# Patient Record
Sex: FEMALE | Race: WHITE | NOT HISPANIC OR LATINO
[De-identification: names, ages, dates, MRNs, and addresses within clinical notes are randomized per-mention and may not be internally consistent; named-entity substitution may affect disease eponyms.]

---

## 2017-10-09 ENCOUNTER — FORM ENCOUNTER (OUTPATIENT)
Age: 47
End: 2017-10-09

## 2017-10-10 ENCOUNTER — APPOINTMENT (OUTPATIENT)
Dept: ORTHOPEDIC SURGERY | Facility: CLINIC | Age: 47
End: 2017-10-10
Payer: COMMERCIAL

## 2017-10-10 ENCOUNTER — OUTPATIENT (OUTPATIENT)
Dept: OUTPATIENT SERVICES | Facility: HOSPITAL | Age: 47
LOS: 1 days | End: 2017-10-10
Payer: COMMERCIAL

## 2017-10-10 VITALS
HEIGHT: 68 IN | DIASTOLIC BLOOD PRESSURE: 70 MMHG | BODY MASS INDEX: 21.98 KG/M2 | WEIGHT: 145 LBS | SYSTOLIC BLOOD PRESSURE: 120 MMHG

## 2017-10-10 DIAGNOSIS — Z78.9 OTHER SPECIFIED HEALTH STATUS: ICD-10-CM

## 2017-10-10 PROCEDURE — 72100 X-RAY EXAM L-S SPINE 2/3 VWS: CPT

## 2017-10-10 PROCEDURE — 72082 X-RAY EXAM ENTIRE SPI 2/3 VW: CPT

## 2017-10-10 PROCEDURE — 99205 OFFICE O/P NEW HI 60 MIN: CPT

## 2017-10-10 PROCEDURE — 72082 X-RAY EXAM ENTIRE SPI 2/3 VW: CPT | Mod: 26

## 2017-10-11 ENCOUNTER — TRANSCRIPTION ENCOUNTER (OUTPATIENT)
Age: 47
End: 2017-10-11

## 2017-10-13 PROBLEM — Z78.9 EXERCISES OCCASIONALLY: Status: ACTIVE | Noted: 2017-10-10

## 2018-03-11 ENCOUNTER — FORM ENCOUNTER (OUTPATIENT)
Age: 48
End: 2018-03-11

## 2018-03-12 ENCOUNTER — APPOINTMENT (OUTPATIENT)
Dept: ORTHOPEDIC SURGERY | Facility: CLINIC | Age: 48
End: 2018-03-12
Payer: COMMERCIAL

## 2018-03-12 ENCOUNTER — OUTPATIENT (OUTPATIENT)
Dept: OUTPATIENT SERVICES | Facility: HOSPITAL | Age: 48
LOS: 1 days | End: 2018-03-12
Payer: COMMERCIAL

## 2018-03-12 VITALS — WEIGHT: 145 LBS | BODY MASS INDEX: 21.98 KG/M2 | HEIGHT: 68 IN

## 2018-03-12 DIAGNOSIS — M48.061 SPINAL STENOSIS, LUMBAR REGION WITHOUT NEUROGENIC CLAUDICATION: ICD-10-CM

## 2018-03-12 DIAGNOSIS — M62.562 MUSCLE WASTING AND ATROPHY, NOT ELSEWHERE CLASSIFIED, LEFT LOWER LEG: ICD-10-CM

## 2018-03-12 PROCEDURE — 72110 X-RAY EXAM L-2 SPINE 4/>VWS: CPT | Mod: 26

## 2018-03-12 PROCEDURE — 71046 X-RAY EXAM CHEST 2 VIEWS: CPT

## 2018-03-12 PROCEDURE — 99215 OFFICE O/P EST HI 40 MIN: CPT

## 2018-03-12 PROCEDURE — 71046 X-RAY EXAM CHEST 2 VIEWS: CPT | Mod: 26

## 2018-03-12 PROCEDURE — 72110 X-RAY EXAM L-2 SPINE 4/>VWS: CPT

## 2018-03-12 RX ORDER — DEXAMETHASONE 4 MG/1
4 TABLET ORAL
Qty: 14 | Refills: 0 | Status: ACTIVE | COMMUNITY
Start: 2018-03-12 | End: 1900-01-01

## 2018-03-12 RX ORDER — DIAZEPAM 5 MG/1
5 TABLET ORAL
Qty: 1 | Refills: 0 | Status: ACTIVE | COMMUNITY
Start: 2018-03-12 | End: 1900-01-01

## 2018-03-14 ENCOUNTER — APPOINTMENT (OUTPATIENT)
Dept: PULMONOLOGY | Facility: CLINIC | Age: 48
End: 2018-03-14

## 2018-03-20 ENCOUNTER — APPOINTMENT (OUTPATIENT)
Dept: ORTHOPEDIC SURGERY | Facility: CLINIC | Age: 48
End: 2018-03-20

## 2018-03-21 VITALS
WEIGHT: 151.46 LBS | HEIGHT: 68 IN | TEMPERATURE: 98 F | RESPIRATION RATE: 16 BRPM | SYSTOLIC BLOOD PRESSURE: 124 MMHG | OXYGEN SATURATION: 99 % | DIASTOLIC BLOOD PRESSURE: 61 MMHG | HEART RATE: 73 BPM

## 2018-03-21 NOTE — ASU PREOP CHECKLIST - SELECT TESTS ORDERED
PT/PTT/INR/EKG/CXR/BMP/urine c&s/Urinalysis/CBC urine c&s  urine hcg-neg/BMP/INR/PT/PTT/Urinalysis/EKG/CXR/CBC

## 2018-03-22 ENCOUNTER — APPOINTMENT (OUTPATIENT)
Dept: ORTHOPEDIC SURGERY | Facility: HOSPITAL | Age: 48
End: 2018-03-22

## 2018-03-22 ENCOUNTER — OUTPATIENT (OUTPATIENT)
Dept: OUTPATIENT SERVICES | Facility: HOSPITAL | Age: 48
LOS: 1 days | Discharge: HOME | End: 2018-03-22
Payer: COMMERCIAL

## 2018-03-22 VITALS
OXYGEN SATURATION: 99 % | RESPIRATION RATE: 17 BRPM | HEART RATE: 70 BPM | TEMPERATURE: 99 F | SYSTOLIC BLOOD PRESSURE: 144 MMHG | DIASTOLIC BLOOD PRESSURE: 82 MMHG

## 2018-03-22 DIAGNOSIS — Z98.890 OTHER SPECIFIED POSTPROCEDURAL STATES: Chronic | ICD-10-CM

## 2018-03-22 PROCEDURE — 86900 BLOOD TYPING SEROLOGIC ABO: CPT

## 2018-03-22 PROCEDURE — 86850 RBC ANTIBODY SCREEN: CPT

## 2018-03-22 PROCEDURE — 76000 FLUOROSCOPY <1 HR PHYS/QHP: CPT

## 2018-03-22 PROCEDURE — 63030 LAMOT DCMPRN NRV RT 1 LMBR: CPT | Mod: 50

## 2018-03-22 PROCEDURE — 86901 BLOOD TYPING SEROLOGIC RH(D): CPT

## 2018-03-22 PROCEDURE — 63030 LAMOT DCMPRN NRV RT 1 LMBR: CPT | Mod: LT

## 2018-03-22 RX ORDER — ACETAMINOPHEN WITH CODEINE 300MG-30MG
1 TABLET ORAL EVERY 4 HOURS
Qty: 0 | Refills: 0 | Status: DISCONTINUED | OUTPATIENT
Start: 2018-03-22 | End: 2018-03-22

## 2018-03-22 RX ORDER — DIAZEPAM 5 MG
1 TABLET ORAL
Qty: 42 | Refills: 0 | OUTPATIENT
Start: 2018-03-22 | End: 2018-04-04

## 2018-03-22 RX ORDER — HYDROMORPHONE HYDROCHLORIDE 2 MG/ML
0.5 INJECTION INTRAMUSCULAR; INTRAVENOUS; SUBCUTANEOUS
Qty: 0 | Refills: 0 | Status: DISCONTINUED | OUTPATIENT
Start: 2018-03-22 | End: 2018-03-22

## 2018-03-22 RX ORDER — ACETAMINOPHEN WITH CODEINE 300MG-30MG
2 TABLET ORAL EVERY 4 HOURS
Qty: 0 | Refills: 0 | Status: DISCONTINUED | OUTPATIENT
Start: 2018-03-22 | End: 2018-03-22

## 2018-03-22 RX ORDER — SODIUM CHLORIDE 9 MG/ML
1000 INJECTION, SOLUTION INTRAVENOUS
Qty: 0 | Refills: 0 | Status: DISCONTINUED | OUTPATIENT
Start: 2018-03-22 | End: 2018-03-22

## 2018-03-22 RX ORDER — METHOCARBAMOL 500 MG/1
500 TABLET, FILM COATED ORAL EVERY 8 HOURS
Qty: 0 | Refills: 0 | Status: DISCONTINUED | OUTPATIENT
Start: 2018-03-22 | End: 2018-03-22

## 2018-03-22 RX ORDER — CEPHALEXIN 500 MG
1 CAPSULE ORAL
Qty: 12 | Refills: 0 | OUTPATIENT
Start: 2018-03-22 | End: 2018-03-23

## 2018-03-22 RX ORDER — ONDANSETRON 8 MG/1
4 TABLET, FILM COATED ORAL ONCE
Qty: 0 | Refills: 0 | Status: DISCONTINUED | OUTPATIENT
Start: 2018-03-22 | End: 2018-03-22

## 2018-03-22 RX ORDER — BUPIVACAINE 13.3 MG/ML
20 INJECTION, SUSPENSION, LIPOSOMAL INFILTRATION ONCE
Qty: 0 | Refills: 0 | Status: DISCONTINUED | OUTPATIENT
Start: 2018-03-22 | End: 2018-03-22

## 2018-03-22 RX ADMIN — Medication 2 TABLET(S): at 20:26

## 2018-03-22 RX ADMIN — Medication 2 TABLET(S): at 20:15

## 2018-03-22 NOTE — DISCHARGE NOTE ADULT - HOSPITAL COURSE
Admitted  Surgery Microdiscectomy L5-S1 3/22/18  Claudia-op Antibiotics  Pain control  DVT prophylaxis  OOB/Physical Therapy

## 2018-03-22 NOTE — PROGRESS NOTE ADULT - SUBJECTIVE AND OBJECTIVE BOX
Ortho Post Op Check    Procedure: Microdisc L5-S1  Surgeon: Dr. Ryan    Pt comfortable without complaints, pain controlled  Denies CP, SOB, N/V, numbness/tingling     Vital Signs Last 24 Hrs  T(C): 36.4 (03-22-18 @ 14:40), Max: 36.4 (03-22-18 @ 14:40)  T(F): 97.5 (03-22-18 @ 14:40), Max: 97.5 (03-22-18 @ 14:40)  HR: 62 (03-22-18 @ 14:45) (62 - 72)  BP: 151/73 (03-22-18 @ 14:40) (151/73 - 151/73)  BP(mean): 108 (03-22-18 @ 14:40) (108 - 108)  RR: 20 (03-22-18 @ 14:45) (18 - 20)  SpO2: 100% (03-22-18 @ 14:45) (99% - 100%)    General: Pt Alert and oriented, NAD  DSG C/D/I back  Pulses intact b/l LE  Sensation intact b/l LE  Motor: EHL/FHL/TA/GS 5/5 b/l    A/P: 47yFemale POD#0 s/p Microdisc L5-S1  - Pain Control  - DVT ppx: scds  - OOB  - D/C home today if stable    Ortho Pager [phone]

## 2018-03-22 NOTE — PROGRESS NOTE ADULT - SUBJECTIVE AND OBJECTIVE BOX
Orthopaedic Spine Attending Note    S: no acute PACU events.  Pain controlled in back, reports signfiicant reduction of preop leg pain.  No fevers/chills/shortness of breath/chest pain/new neurologic complaints.    O:  T(C): 36.4 (03-22-18 @ 14:40), Max: 36.4 (03-22-18 @ 14:40)  HR: 64 (03-22-18 @ 16:30) (52 - 72)  BP: 150/69 (03-22-18 @ 16:00) (146/59 - 154/61)  RR: 27 (03-22-18 @ 16:30) (16 - 27)  SpO2: 100% (03-22-18 @ 16:30) (99% - 100%)  Wt(kg): --  GEN: NAD, A and O x 3  Incision: Dressing c/d/i    Motor:  5/5 L2-S1 bilaterally    Sensory:  Sensation intact to light touch L2-S1 bilaterally    Vascular:  No edema, calf tenderness, wwp, 2+ DP bilateral lower extremities      A/P: 47y Female POD #0 s/p Left L5/S1 microdiscectomy, doing well  -mobilize, OOB  -PT  -pain control  -SCDs  -dispo: home today if pain controlled, ambulating and voiding appropriately

## 2018-03-22 NOTE — BRIEF OPERATIVE NOTE - PROCEDURE
<<-----Click on this checkbox to enter Procedure Microdiscectomy of lumbar spine  03/22/2018  L5/S1 laminectomy, left medial facetectomy, foraminotomy, microdiscectomy  Active  BRANDI

## 2018-03-22 NOTE — DISCHARGE NOTE ADULT - ADDITIONAL INSTRUCTIONS
No strenuous activity (bending/twisting), heavy lifting, driving or returning to work until cleared by MD.  Change dressing daily with gauze/tape till post-op day #5, then leave incision open to air.  You may shower post-op day#5, keep incision clean and dry.   Try to have regular bowel movements, take stool softener or laxative if necessary.  May take pepcid or zantac for upset stomach.  May apply ice to affected areas to decrease swelling.  Call to schedule an appointment with Dr. Ryan for follow up, if you have staples or sutures they will be removed in office.  Contact your doctor if you experience: fever greater than 101.5, chills, chest pain, difficulty breathing, redness or excessive drainage around the incision, other concerns.  Follow up with your primary care provider.

## 2018-03-22 NOTE — DISCHARGE NOTE ADULT - CARE PLAN
Principal Discharge DX:	Sciatica  Goal:	Improvement after surgery.  Assessment and plan of treatment:	See below.

## 2018-03-22 NOTE — CONSULT NOTE ADULT - SUBJECTIVE AND OBJECTIVE BOX
Pre-surgical Pain Inventory  HPI:      Surgery:     Surgeon:     PAST MEDICAL & SURGICAL HISTORY:  Sciatica: left leg  Back pain  H/O arthroscopy of left knee      Current Pre-op Pain Medications  None, Decadron 4mg   Prescribed by: Connor    Past Pain Medication:   [] Oxycodone  [] Hydrocodone  [] Methadone  [] Fentanyl  [] Dilaudid  [] Morphine  [] Tramadol  [] NSAIDs/Anti-inflammatories  [] Medrol/Decadron  [] Neuropathic Agents  [] Muscle Relaxants  [] Anxiolytics  [] Anti-depressants   [] Sleep aids    Allergies      Intolerances    codeine (Drowsiness)      Vital Signs:  Vital Signs Last 24 Hrs  T(C): 36.9 (21 Mar 2018 11:12), Max: 36.9 (21 Mar 2018 11:12)  T(F): 98.5 (21 Mar 2018 11:12), Max: 98.5 (21 Mar 2018 11:12)  HR: 73 (21 Mar 2018 11:12) (73 - 73)  BP: 124/61 (21 Mar 2018 11:12) (124/61 - 124/61)  BP(mean): --  RR: 16 (21 Mar 2018 11:12) (16 - 16)  SpO2: 99% (21 Mar 2018 11:12) (99% - 99%)    Labs:       FUNCTIONAL ASSESSMENT:  AVERAGE DAILY PAIN SCORE:    PAIN ASSESSMENT:    PHYSICAL EXAM  GENERAL: NAD   HEAD:  Atraumatic, Normocephalic  EYES: EOMI, PERRLA, conjunctiva and sclera clear  NECK: Supple, ___ collar  NERVOUS SYSTEM:    Alert & Oriented X3, Good concentration;   Cranial nerves grossly intact  Motor Strength 5/5 B/L upper and lower extremities;   Sensation intact to LT in UE/LE in 3 dermatomes    Cervical: No facet tenderness. Negative Spurlings sign. Negative Carvajal's sign. Negative Brudzinski's sign. Negative Kernig's sign. Cervical ROM not assessed, s/p surgery and restricted turning.  ROM  Cervical flexion: 50  Right lateral flexion: 45  Left lateral flexion: 45  Extension: 60  Left rotation: 80  Right rotation: 80    Lumbar: No lumbar tenderness. Negative straight leg raise. Negative crossed straight leg raise. Negative Zac's sign. Negative facet loading maneuvers. Lumbar ROM not assessed, s/p surgery and restricted turning.  ROM  Trunk extension: 25  Trunk flexion: 90  Right rotation: 25  Left rotation: 25    CHEST/LUNG: Clear to auscultation bilaterally; No rales, rhonchi, wheezing, or rubs  HEART: Regular rate and rhythm; No murmurs, rubs, or gallops  ABDOMEN: Soft, Nontender, Nondistended; Bowel sounds present  EXTREMITIES:  2+ Peripheral Pulses, No clubbing, cyanosis, or edema  SKIN: No rashes or lesions    Assessment:       Plan:   Immediate post-op plan  -	PCA Settings:  	Opioid:  	Initial Demand:  	Lockout:  	4 hour limit:	  -  Rescue plan  -  -  Tentative floor plan  -  - Pre-surgical Pain Inventory    Surgery: L5S1 roel-lami discectomy   Surgeon: Dr Ryan    PAST MEDICAL & SURGICAL HISTORY:  Sciatica: left leg  Back pain  H/O arthroscopy of left knee    Current Pre-op Pain Medications  None, Decadron 4mg   Prescribed by: Connor    Past Pain Medication:   [X] Codeine- some lightheadedness/dizziness  [] Oxycodone  [] Hydrocodone  [] Methadone  [] Fentanyl  [] Dilaudid  [] Morphine  [] Tramadol  [X] NSAIDs/Anti-inflammatories  [X] Medrol/Decadron  [] Neuropathic Agents  [] Muscle Relaxants  [] Anxiolytics  [] Anti-depressants   [] Sleep aids    Allergies    Intolerances  Codeine (Drowsiness)    Vital Signs:  Vital Signs Last 24 Hrs  T(C): 36.9 (21 Mar 2018 11:12), Max: 36.9 (21 Mar 2018 11:12)  T(F): 98.5 (21 Mar 2018 11:12), Max: 98.5 (21 Mar 2018 11:12)  HR: 73 (21 Mar 2018 11:12) (73 - 73)  BP: 124/61 (21 Mar 2018 11:12) (124/61 - 124/61)  BP(mean): --  RR: 16 (21 Mar 2018 11:12) (16 - 16)  SpO2: 99% (21 Mar 2018 11:12) (99% - 99%)    FUNCTIONAL ASSESSMENT:  AVERAGE DAILY PAIN SCORE: 8/10    PAIN ASSESSMENT:   Pt complains of L sided low back pain which radiates into LLE from L buttock/groin/medial thigh into calf. Pt states that quality of pain is primarily deep cramping and aching. Pain is worse with activity, pt states she is an avid  and biker and reports sig disability d/t pain. Reports episodes of L knee buckling. Pain improved with NSAIDs.     PHYSICAL EXAM  GENERAL: NAD, young female seated in chair in NAD.  NERVOUS SYSTEM:    Alert & Oriented X3, Good concentration;   Cranial nerves grossly intact  Motor Strength 5/5 B/L upper and lower extremities;   Sensation intact to LT in UE/LE in 3 dermatomes  SKIN: No rashes or lesions    Assessment:   46yo F w/ LBP with LLE radic w/ plan for L5S1 roel-lami discectomy     Plan:   Immediate post-op plan  - Tylenol #3 1-2 tabs q4h PRN for Mod-Severe Pain (with food)  - Robaxin 500mg TID PRN for Spams/stiffness  - Ibuprofen when possible    Rescue plan  - Dilaudid 0.5mg q1h for Severe Breakthrough Pain in PACU  - Percocet 5mg if no relief from Tylenol #3    Tentative floor plan  - Tylenol #3 q4h PRN   - Robaxin 500mg TID

## 2018-03-22 NOTE — DISCHARGE NOTE ADULT - PATIENT PORTAL LINK FT
You can access the [employer] Patient Portal, offered by [facility], by registering with the following website: [email or web address]

## 2018-03-23 ENCOUNTER — MESSAGE (OUTPATIENT)
Age: 48
End: 2018-03-23

## 2018-03-27 PROBLEM — M54.30 SCIATICA, UNSPECIFIED SIDE: Chronic | Status: ACTIVE | Noted: 2018-03-21

## 2018-03-27 PROBLEM — M54.9 DORSALGIA, UNSPECIFIED: Chronic | Status: ACTIVE | Noted: 2018-03-21

## 2018-04-04 ENCOUNTER — APPOINTMENT (OUTPATIENT)
Dept: ORTHOPEDIC SURGERY | Facility: CLINIC | Age: 48
End: 2018-04-04
Payer: COMMERCIAL

## 2018-04-04 VITALS — BODY MASS INDEX: 21.98 KG/M2 | HEIGHT: 68 IN | WEIGHT: 145 LBS

## 2018-04-04 PROCEDURE — 99024 POSTOP FOLLOW-UP VISIT: CPT

## 2018-05-09 ENCOUNTER — APPOINTMENT (OUTPATIENT)
Dept: ORTHOPEDIC SURGERY | Facility: CLINIC | Age: 48
End: 2018-05-09
Payer: COMMERCIAL

## 2018-05-09 VITALS — BODY MASS INDEX: 21.98 KG/M2 | HEIGHT: 68 IN | WEIGHT: 145 LBS

## 2018-05-09 PROCEDURE — 99024 POSTOP FOLLOW-UP VISIT: CPT

## 2018-06-27 ENCOUNTER — APPOINTMENT (OUTPATIENT)
Dept: ORTHOPEDIC SURGERY | Facility: CLINIC | Age: 48
End: 2018-06-27
Payer: COMMERCIAL

## 2018-06-27 DIAGNOSIS — M51.16 INTERVERTEBRAL DISC DISORDERS WITH RADICULOPATHY, LUMBAR REGION: ICD-10-CM

## 2018-06-27 DIAGNOSIS — Z98.890 OTHER SPECIFIED POSTPROCEDURAL STATES: ICD-10-CM

## 2018-06-27 PROCEDURE — 99213 OFFICE O/P EST LOW 20 MIN: CPT

## 2018-08-20 ENCOUNTER — APPOINTMENT (OUTPATIENT)
Dept: ORTHOPEDIC SURGERY | Facility: HOSPITAL | Age: 48
End: 2018-08-20

## 2019-02-04 PROBLEM — M51.16 LUMBAR DISC HERNIATION WITH RADICULOPATHY: Status: ACTIVE | Noted: 2017-10-13

## 2022-11-29 ENCOUNTER — TRANSCRIPTION ENCOUNTER (OUTPATIENT)
Age: 52
End: 2022-11-29

## 2023-03-02 ENCOUNTER — OFFICE VISIT (OUTPATIENT)
Dept: URGENT CARE | Facility: CLINIC | Age: 53
End: 2023-03-02

## 2023-03-02 ENCOUNTER — TELEPHONE (OUTPATIENT)
Dept: OBGYN CLINIC | Facility: HOSPITAL | Age: 53
End: 2023-03-02

## 2023-03-02 VITALS
HEART RATE: 62 BPM | SYSTOLIC BLOOD PRESSURE: 169 MMHG | DIASTOLIC BLOOD PRESSURE: 76 MMHG | OXYGEN SATURATION: 99 % | RESPIRATION RATE: 16 BRPM

## 2023-03-02 DIAGNOSIS — M54.42 ACUTE LEFT-SIDED LOW BACK PAIN WITH LEFT-SIDED SCIATICA: Primary | ICD-10-CM

## 2023-03-02 RX ORDER — CYCLOBENZAPRINE HCL 10 MG
10 TABLET ORAL
Qty: 15 TABLET | Refills: 0 | Status: SHIPPED | OUTPATIENT
Start: 2023-03-02 | End: 2023-03-06

## 2023-03-02 RX ORDER — PREDNISONE 20 MG/1
60 TABLET ORAL DAILY
Qty: 15 TABLET | Refills: 0 | Status: SHIPPED | OUTPATIENT
Start: 2023-03-02 | End: 2023-03-07

## 2023-03-02 NOTE — PROGRESS NOTES
[employer] [facility]        NAME: Shannon Hanson is a 45 y o  female  : 1970    MRN: [id number]  DATE: 2023  TIME: 6:08 PM      Assessment and Plan     Acute left-sided low back pain with left-sided sciatica [M54 42]  1  Acute left-sided low back pain with left-sided sciatica  predniSONE 20 mg tablet    cyclobenzaprine (FLEXERIL) 10 mg tablet    Ambulatory Referral to Orthopedic Surgery            Patient Instructions   There are no Patient Instructions on file for this visit  Follow up with PCP in 3-5 days  Go to ER if symptoms worsen  Chief Complaint     Chief Complaint   Patient presents with   • Back Pain     SINCE Tuesday, AFTER SHOVELING         History of Present Illness     Patient presents with low back pain that radiates down her left leg x 2 days  She states she had back surgery in 2018 at the S1 level for this same issue  She was shoveling snow 2 days ago and thinks she aggravated it  She has been taking ibuprofen without much relief  Denies loss of bowel/bladder, foot drop, and numbness/tingling  Review of Systems     Review of Systems   Constitutional: Negative for chills, fatigue and fever  HENT: Negative for congestion, ear pain, postnasal drip, rhinorrhea, sinus pressure, sinus pain and sore throat  Eyes: Negative for pain and visual disturbance  Respiratory: Negative for cough, chest tightness and shortness of breath  Cardiovascular: Negative for chest pain and palpitations  Gastrointestinal: Negative for abdominal pain, diarrhea, nausea and vomiting  Genitourinary: Negative for dysuria and hematuria  Musculoskeletal: Positive for back pain and myalgias  Negative for arthralgias  Skin: Negative for color change and rash  Neurological: Negative for dizziness, seizures, syncope, numbness and headaches  All other systems reviewed and are negative          Current Medications       Current Outpatient Medications:   •  cyclobenzaprine (FLEXERIL) 10 mg tablet, Take 1 tablet (10 mg total) by mouth daily at bedtime as needed for muscle spasms, Disp: 15 tablet, Rfl: 0  •  predniSONE 20 mg tablet, Take 3 tablets (60 mg total) by mouth daily for 5 days, Disp: 15 tablet, Rfl: 0    Current Allergies     Allergies as of 03/02/2023   • (No Known Allergies)              The following portions of the patient's history were reviewed and updated as appropriate: allergies, current medications, past family history, past medical history, past social history, past surgical history, and problem list      History reviewed  No pertinent past medical history  History reviewed  No pertinent surgical history  History reviewed  No pertinent family history  Medications have been verified  Objective     /76   Pulse 62   Resp 16   SpO2 99%   No LMP recorded  Physical Exam     Physical Exam  Vitals and nursing note reviewed  Constitutional:       Appearance: Normal appearance  She is normal weight  Pulmonary:      Effort: No respiratory distress  Musculoskeletal:      Lumbar back: Spasms and tenderness present  No swelling, edema, deformity, signs of trauma, lacerations or bony tenderness  Decreased range of motion  Positive left straight leg raise test  Negative right straight leg raise test  No scoliosis  Comments: Mildly decreased ROM in all directions limited due to pain  Skin:     General: Skin is warm and dry  Neurological:      General: No focal deficit present  Mental Status: She is alert and oriented to person, place, and time     Psychiatric:         Mood and Affect: Mood normal          Behavior: Behavior normal

## 2023-03-02 NOTE — TELEPHONE ENCOUNTER
Caller: Patient     Doctor: Jackie Delgado    Reason for call: Patient would like to see spine specialist in the [facility] area but she does not have any recent imaging  She will get imaging done through PCP and call back to schedule   She had spine surgery in 2018 in [place] and does not have those records    Call back#: [phone]

## 2023-03-02 NOTE — TELEPHONE ENCOUNTER
Caller: Alexey Velez    Doctor: na    Reason for call: Patient calling back to advise she requested her previous MR's, patient given fax number to have records faxed over for review    Call back#: [phone]

## 2023-03-06 ENCOUNTER — OFFICE VISIT (OUTPATIENT)
Dept: OBGYN CLINIC | Facility: CLINIC | Age: 53
End: 2023-03-06

## 2023-03-06 ENCOUNTER — APPOINTMENT (OUTPATIENT)
Dept: RADIOLOGY | Facility: CLINIC | Age: 53
End: 2023-03-06

## 2023-03-06 VITALS
WEIGHT: 158 LBS | OXYGEN SATURATION: 98 % | SYSTOLIC BLOOD PRESSURE: 118 MMHG | HEIGHT: 68 IN | HEART RATE: 76 BPM | BODY MASS INDEX: 23.95 KG/M2 | RESPIRATION RATE: 18 BRPM | DIASTOLIC BLOOD PRESSURE: 76 MMHG

## 2023-03-06 DIAGNOSIS — M54.42 ACUTE LEFT-SIDED LOW BACK PAIN WITH LEFT-SIDED SCIATICA: Primary | ICD-10-CM

## 2023-03-06 DIAGNOSIS — M54.40 ACUTE BILATERAL LOW BACK PAIN WITH SCIATICA, SCIATICA LATERALITY UNSPECIFIED: ICD-10-CM

## 2023-03-06 DIAGNOSIS — S39.012A STRAIN OF LUMBAR REGION, INITIAL ENCOUNTER: ICD-10-CM

## 2023-03-06 RX ORDER — METHOCARBAMOL 500 MG/1
500 TABLET, FILM COATED ORAL 4 TIMES DAILY
Qty: 90 TABLET | Refills: 0 | Status: SHIPPED | OUTPATIENT
Start: 2023-03-06 | End: 2023-03-07

## 2023-03-06 RX ORDER — IBUPROFEN 400 MG/1
400 TABLET ORAL EVERY 6 HOURS PRN
Qty: 60 TABLET | Refills: 0 | Status: SHIPPED | OUTPATIENT
Start: 2023-03-06

## 2023-03-06 RX ORDER — IBUPROFEN 400 MG/1
TABLET ORAL EVERY 6 HOURS PRN
COMMUNITY
End: 2023-03-06 | Stop reason: SDUPTHER

## 2023-03-06 NOTE — PROGRESS NOTES
Assessment/Plan:    No problem-specific Assessment & Plan notes found for this encounter  Diagnoses and all orders for this visit:    Acute left-sided low back pain with left-sided sciatica  -     Ambulatory Referral to Orthopedic Surgery  -     MRI lumbar spine wo contrast; Future  -     ibuprofen (MOTRIN) 400 mg tablet; Take 1 tablet (400 mg total) by mouth every 6 (six) hours as needed for mild pain  -     methocarbamol (ROBAXIN) 500 mg tablet; Take 1 tablet (500 mg total) by mouth 4 (four) times a day    Acute bilateral low back pain with sciatica, sciatica laterality unspecified  -     XR spine lumbar minimum 4 views non injury; Future    Strain of lumbar region, initial encounter    Other orders  -     Discontinue: ibuprofen (MOTRIN) 400 mg tablet; Take by mouth every 6 (six) hours as needed for mild pain        Patient was evaluated in office today  Clinical impression is that patient's lower back pain is the multifactorial nature secondary to lumbar strain and associated radiculopathy  She is not exhibiting any neurologic deficits on exam today and I advised patient that recommended treatment option would be continued use of muscle relaxant oral anti-inflammatory and physician directed physical therapy  Patient is requesting a MRI of the lumbar spine for further evaluation  I did discuss with patient that given her symptoms I recommended treatment plan would be to first start with physical therapy and consider MRI of lumbar spine if symptoms fail to improve as I do believe that primary issue seems to be related to muscle etiology  After Counseling patient would like to proceed with MRI L-spine  MRI order was placed however I did discuss with patient that if MRI claim is denied she would likely need to complete conservative physical therapy treatment for resubmission of MRI L-spine  Radiographs of the lumbar spine were reviewed independently with patient in office today    Some mild degenerative changes were noted with no fractures or dislocations  Follow-up official reading    Subjective:      Patient ID: Zully Reeves is a 45 y o  female presenting for initial evaluation lower back pain with left-sided radicular symptoms  Patient states that she had prior microdiscectomy and laminectomy of the L5-S1 in 2018  States that she was doing very well since the surgery  States that this past Tuesday she was shoveling snow when she reaggravated the back  Started developing pain in the left lower lumbar region with radiation and numbness and tingling down the posterior aspect of the knee  She denies any bowel or bladder incontinence and no weakness  She was seen in urgent care where prednisone muscle relaxant and oral anti-inflammatories provided  She reports significant improvement in terms of pain symptoms  Improvement by about 40%  Patient is requesting an MRI of the lumbar spine today  HPI    The following portions of the patient's history were reviewed and updated as appropriate: allergies, current medications, past family history, past medical history, past social history, past surgical history and problem list     Review of Systems      Objective:      /76   Pulse 76   Resp 18   Ht 5' 8" (1 727 m)   Wt 71 7 kg (158 lb)   SpO2 98%   BMI 24 02 kg/m²          Physical Exam  Back exam    Patient is functional motion with flexion and extension right and left rotation    Forward flexion extension and right-sided rotation elicits pain in the left lumbar region  No tenderness to palpation in the spinous or paraspinals of the lumbar spine  Tenderness to palpation in the quadratus lumborum region left  Negative stork  Negative slump  Positive straight leg raise on left  No sensory deficits to light touch  Able to perform heel and toe walk

## 2023-03-07 DIAGNOSIS — M54.40 ACUTE BILATERAL LOW BACK PAIN WITH SCIATICA, SCIATICA LATERALITY UNSPECIFIED: Primary | ICD-10-CM

## 2023-03-07 RX ORDER — CYCLOBENZAPRINE HCL 5 MG
5 TABLET ORAL 3 TIMES DAILY PRN
Qty: 90 TABLET | Refills: 0 | Status: SHIPPED | OUTPATIENT
Start: 2023-03-07

## 2023-03-08 ENCOUNTER — TELEPHONE (OUTPATIENT)
Dept: OBGYN CLINIC | Facility: CLINIC | Age: 53
End: 2023-03-08

## 2023-03-08 NOTE — TELEPHONE ENCOUNTER
Caller: patient     Doctor: Cj Ahmadi     Reason for call: calling to cancel apt but was previously cancelled       Call back#: n/a

## 2023-03-08 NOTE — TELEPHONE ENCOUNTER
OBC Spoke with patient made aware MRI not Auth ,will need to do 6 weeks of PT    Stated she she will call me back ,she is going to try to see if she can speak with her insurance

## 2023-03-09 ENCOUNTER — TELEPHONE (OUTPATIENT)
Dept: OBGYN CLINIC | Facility: HOSPITAL | Age: 53
End: 2023-03-09

## 2023-03-09 NOTE — TELEPHONE ENCOUNTER
Caller: WESTON DOWLING    Doctor:Soto    Reason for call: only had 2 forms of ID for the patient, will call back    Call back#: NA

## 2023-03-15 ENCOUNTER — TELEPHONE (OUTPATIENT)
Dept: OTHER | Facility: OTHER | Age: 53
End: 2023-03-15

## 2023-03-15 NOTE — TELEPHONE ENCOUNTER
Patient is calling regarding cancelling an appointment      Date/Time: 003/16/2023 @11am    Patient was rescheduled: YES [] NO [x]    Patient requesting call back to reschedule: YES [] NO [x]

## 2023-07-28 DIAGNOSIS — M54.42 ACUTE LEFT-SIDED LOW BACK PAIN WITH LEFT-SIDED SCIATICA: ICD-10-CM

## 2023-07-28 RX ORDER — IBUPROFEN 400 MG/1
TABLET ORAL
Qty: 60 TABLET | Refills: 0 | Status: SHIPPED | OUTPATIENT
Start: 2023-07-28